# Patient Record
(demographics unavailable — no encounter records)

---

## 2025-02-25 NOTE — PHYSICAL EXAM
[Chaperone Present] : A chaperone was present in the examining room during all aspects of the physical examination [Labia Majora] : normal [Labia Minora] : normal [Atrophy] : atrophy [Normal] : normal [Uterine Adnexae] : non-palpable [FreeTextEntry2] : [place]

## 2025-02-25 NOTE — HISTORY OF PRESENT ILLNESS
[Y] : Positive pregnancy history [___] : #2 ([unfilled]): [Pregnancy History] : boy [Previously active] : previously active [Men] : men [TextBox_4] : 74yo  postmenopausal, she denies any vaginal bleeding [Mammogramdate] : 12/2024 [PapSmeardate] : 2/25/2025 [BoneDensityDate] : 12/2024 [ColonoscopyDate] : 2019 [PGHxTotal] : 2 [[facility] [[facility] : 2